# Patient Record
Sex: FEMALE | Race: WHITE | NOT HISPANIC OR LATINO | Employment: FULL TIME | URBAN - METROPOLITAN AREA
[De-identification: names, ages, dates, MRNs, and addresses within clinical notes are randomized per-mention and may not be internally consistent; named-entity substitution may affect disease eponyms.]

---

## 2016-08-04 LAB
EXTERNAL ABO GROUPING: NORMAL
EXTERNAL ANTIBODY SCREEN: NEGATIVE
EXTERNAL HEPATITIS B SURFACE ANTIGEN: NEGATIVE
EXTERNAL HEPATITIS C AB: NORMAL
EXTERNAL RH FACTOR: NEGATIVE
EXTERNAL RUBELLA QUALITATIVE: NORMAL
EXTERNAL SYPHILIS RPR SCREEN: NORMAL
HIV1 AB SPEC QL IA.RAPID: NEGATIVE

## 2017-02-17 LAB — EXTERNAL GROUP B STREP ANTIGEN: NEGATIVE

## 2017-02-18 ENCOUNTER — HOSPITAL ENCOUNTER (OUTPATIENT)
Facility: HOSPITAL | Age: 27
Setting detail: OBSERVATION
Discharge: HOME | End: 2017-02-18
Attending: OBSTETRICS & GYNECOLOGY | Admitting: OBSTETRICS & GYNECOLOGY

## 2017-02-18 VITALS
DIASTOLIC BLOOD PRESSURE: 60 MMHG | SYSTOLIC BLOOD PRESSURE: 108 MMHG | HEIGHT: 65 IN | OXYGEN SATURATION: 98 % | BODY MASS INDEX: 45.32 KG/M2 | TEMPERATURE: 98.1 F | WEIGHT: 272 LBS | HEART RATE: 73 BPM | RESPIRATION RATE: 18 BRPM

## 2017-02-18 PROBLEM — Z34.90 PREGNANCY: Status: ACTIVE | Noted: 2017-02-18

## 2017-02-18 LAB
ALBUMIN SERPL-MCNC: 2.9 G/DL (ref 3.5–5.2)
ALBUMIN/GLOB SERPL: 0.9 G/DL
ALP SERPL-CCNC: 92 U/L (ref 39–117)
ALT SERPL W P-5'-P-CCNC: 13 U/L (ref 1–33)
ANION GAP SERPL CALCULATED.3IONS-SCNC: 14.6 MMOL/L
AST SERPL-CCNC: 10 U/L (ref 1–32)
BASOPHILS # BLD AUTO: 0.03 10*3/MM3 (ref 0–0.2)
BASOPHILS NFR BLD AUTO: 0.2 % (ref 0–1.5)
BILIRUB SERPL-MCNC: <0.2 MG/DL (ref 0.1–1.2)
BUN BLD-MCNC: 10 MG/DL (ref 6–20)
BUN/CREAT SERPL: 17.9 (ref 7–25)
CALCIUM SPEC-SCNC: 9 MG/DL (ref 8.6–10.5)
CHLORIDE SERPL-SCNC: 103 MMOL/L (ref 98–107)
CO2 SERPL-SCNC: 20.4 MMOL/L (ref 22–29)
CREAT BLD-MCNC: 0.56 MG/DL (ref 0.57–1)
DEPRECATED RDW RBC AUTO: 42.3 FL (ref 37–54)
EOSINOPHIL # BLD AUTO: 0.09 10*3/MM3 (ref 0–0.7)
EOSINOPHIL NFR BLD AUTO: 0.7 % (ref 0.3–6.2)
ERYTHROCYTE [DISTWIDTH] IN BLOOD BY AUTOMATED COUNT: 14 % (ref 11.7–13)
EXPIRATION DATE: NORMAL
GFR SERPL CREATININE-BSD FRML MDRD: 132 ML/MIN/1.73
GLOBULIN UR ELPH-MCNC: 3.2 GM/DL
GLUCOSE BLD-MCNC: 84 MG/DL (ref 65–99)
HCT VFR BLD AUTO: 36.1 % (ref 35.6–45.5)
HGB BLD-MCNC: 12 G/DL (ref 11.9–15.5)
IMM GRANULOCYTES # BLD: 0.08 10*3/MM3 (ref 0–0.03)
IMM GRANULOCYTES NFR BLD: 0.6 % (ref 0–0.5)
LYMPHOCYTES # BLD AUTO: 2.67 10*3/MM3 (ref 0.9–4.8)
LYMPHOCYTES NFR BLD AUTO: 20.4 % (ref 19.6–45.3)
Lab: NORMAL
MCH RBC QN AUTO: 27.6 PG (ref 26.9–32)
MCHC RBC AUTO-ENTMCNC: 33.2 G/DL (ref 32.4–36.3)
MCV RBC AUTO: 83 FL (ref 80.5–98.2)
MONOCYTES # BLD AUTO: 1.26 10*3/MM3 (ref 0.2–1.2)
MONOCYTES NFR BLD AUTO: 9.6 % (ref 5–12)
NEUTROPHILS # BLD AUTO: 8.99 10*3/MM3 (ref 1.9–8.1)
NEUTROPHILS NFR BLD AUTO: 68.5 % (ref 42.7–76)
PLATELET # BLD AUTO: 228 10*3/MM3 (ref 140–500)
PMV BLD AUTO: 10.8 FL (ref 6–12)
POTASSIUM BLD-SCNC: 3.8 MMOL/L (ref 3.5–5.2)
PROT SERPL-MCNC: 6.1 G/DL (ref 6–8.5)
PROT UR STRIP-MCNC: NEGATIVE MG/DL
RBC # BLD AUTO: 4.35 10*6/MM3 (ref 3.9–5.2)
SODIUM BLD-SCNC: 138 MMOL/L (ref 136–145)
WBC NRBC COR # BLD: 13.12 10*3/MM3 (ref 4.5–10.7)

## 2017-02-18 PROCEDURE — G0378 HOSPITAL OBSERVATION PER HR: HCPCS

## 2017-02-18 PROCEDURE — 80053 COMPREHEN METABOLIC PANEL: CPT | Performed by: OBSTETRICS & GYNECOLOGY

## 2017-02-18 PROCEDURE — 85025 COMPLETE CBC W/AUTO DIFF WBC: CPT | Performed by: OBSTETRICS & GYNECOLOGY

## 2017-02-18 RX ORDER — ACETAMINOPHEN, ASPIRIN AND CAFFEINE 250; 250; 65 MG/1; MG/1; MG/1
1 TABLET, FILM COATED ORAL EVERY 6 HOURS PRN
COMMUNITY
End: 2017-03-09 | Stop reason: HOSPADM

## 2017-02-18 RX ORDER — LEVOTHYROXINE SODIUM 0.07 MG/1
75 TABLET ORAL DAILY
COMMUNITY

## 2017-02-18 RX ORDER — ASPIRIN 81 MG/1
81 TABLET ORAL DAILY
COMMUNITY

## 2017-02-18 RX ORDER — CALCIUM CARBONATE 200(500)MG
2 TABLET,CHEWABLE ORAL DAILY
COMMUNITY
End: 2017-03-09 | Stop reason: HOSPADM

## 2017-02-18 RX ORDER — PRENATAL VIT/IRON FUM/FOLIC AC 27MG-0.8MG
1 TABLET ORAL DAILY
COMMUNITY

## 2017-02-19 NOTE — NURSING NOTE
MD at [facility]. BPs and labs reviewed. Orders received to send patient home with a 24 hour urine, bring to office on Monday and bedrest until seen again. Eri Bullard RN

## 2017-02-19 NOTE — NURSING NOTE
Phone report called to Dr. Rodriguez. Informed of 25yo  here at 35.2 weeks with c/o increased BP at home. States that it has been increasing since 33 weeks. Was seen in the office yesterday and a CBC and CMP were drawn. Last night around MN she began experiencing dizziness and a HA and has taken her BP a handful of times since MN, all of which were elevated at home. Negative protein on admission here, first 2 BPs given to MD. Patient denies epigastric pain or blurred vision. FHR is reactive and category 1, no contractions. Orders received to observe patient, draw CBC and CMP. MD will be coming for delivery of another patient and will assess at that time. Eri Bullard RN

## 2017-02-19 NOTE — NURSING NOTE
Discharge instructions reviewed with patient. Explained 24 hour urine collection and to bring collection to the MD office on Monday morning. Patient to be on bedrest until seen in the office again, work note given to patient. Patient to return to L&D or call MD with persistent HA, blurred vision, epigastric pain, and/or elevated BP at home. Patient verbalizes understanding of these instructions and denies any further questions or concerns. Patient d/c home. Eri Bullard RN

## 2017-02-19 NOTE — NON STRESS TEST
Jyoti Ghotra, a  at 35w2d with an MARIA GUADALUPE of 3/23/2017, by Patient Reported, was seen at [facility] LABOR DELIVERY for a nonstress test.    Chief Complaint   Patient presents with   • Hypertension     elevated BP in office since 33 weeks. has taken her BP at home several times since MN and all elevated. patient also reports dizziness and HA since MN. denies epigastric pain or blurred vision.       Interpretation A  Nonstress Test Interpretation A: Reactive (17 2140 : Eri Bullard, GUY)        Agree/

## 2017-02-20 ENCOUNTER — LAB REQUISITION (OUTPATIENT)
Dept: LAB | Facility: HOSPITAL | Age: 27
End: 2017-02-20

## 2017-02-20 DIAGNOSIS — Z00.00 ENCOUNTER FOR GENERAL ADULT MEDICAL EXAMINATION WITHOUT ABNORMAL FINDINGS: ICD-10-CM

## 2017-02-20 LAB
ALBUMIN SERPL-MCNC: 3.3 G/DL (ref 3.5–5.2)
ALBUMIN/GLOB SERPL: 1 G/DL
ALP SERPL-CCNC: 99 U/L (ref 39–117)
ALT SERPL W P-5'-P-CCNC: 15 U/L (ref 1–33)
ANION GAP SERPL CALCULATED.3IONS-SCNC: 14.6 MMOL/L
AST SERPL-CCNC: 10 U/L (ref 1–32)
BILIRUB SERPL-MCNC: <0.2 MG/DL (ref 0.1–1.2)
BUN BLD-MCNC: 6 MG/DL (ref 6–20)
BUN/CREAT SERPL: 9 (ref 7–25)
CALCIUM SPEC-SCNC: 9.1 MG/DL (ref 8.6–10.5)
CHLORIDE SERPL-SCNC: 102 MMOL/L (ref 98–107)
CO2 SERPL-SCNC: 22.4 MMOL/L (ref 22–29)
COLLECT DURATION TIME UR: 24 HRS
COLLECT DURATION TIME UR: 24 HRS
CREAT BLD-MCNC: 0.67 MG/DL (ref 0.57–1)
CREAT CL 24H UR+SERPL-VRATE: 105.1 ML/MIN (ref 88–128)
CREAT CL 24H UR+SERPL-VRATE: 151.3 L/24 HR (ref 126.7–184.3)
CREAT UR-MCNC: 67.9 MG/DL
CREATINE 24H UR-MRATE: 1.32 G/24 HR (ref 0.7–1.6)
GFR SERPL CREATININE-BSD FRML MDRD: 107 ML/MIN/1.73
GLOBULIN UR ELPH-MCNC: 3.3 GM/DL
GLUCOSE BLD-MCNC: 160 MG/DL (ref 65–99)
POTASSIUM BLD-SCNC: 4 MMOL/L (ref 3.5–5.2)
PROT 24H UR-MRATE: 156 MG/24HOURS (ref 0–150)
PROT SERPL-MCNC: 6.6 G/DL (ref 6–8.5)
PROT UR-MCNC: 8 MG/DL
SODIUM BLD-SCNC: 139 MMOL/L (ref 136–145)
SPECIMEN VOL 24H UR: 1950 ML

## 2017-02-20 PROCEDURE — 82575 CREATININE CLEARANCE TEST: CPT | Performed by: OBSTETRICS & GYNECOLOGY

## 2017-02-20 PROCEDURE — 84156 ASSAY OF PROTEIN URINE: CPT | Performed by: OBSTETRICS & GYNECOLOGY

## 2017-02-20 PROCEDURE — 81050 URINALYSIS VOLUME MEASURE: CPT | Performed by: OBSTETRICS & GYNECOLOGY

## 2017-02-20 PROCEDURE — 80053 COMPREHEN METABOLIC PANEL: CPT | Performed by: OBSTETRICS & GYNECOLOGY

## 2017-03-03 ENCOUNTER — HOSPITAL ENCOUNTER (INPATIENT)
Facility: HOSPITAL | Age: 27
LOS: 6 days | Discharge: HOME | End: 2017-03-09
Attending: OBSTETRICS & GYNECOLOGY | Admitting: OBSTETRICS & GYNECOLOGY

## 2017-03-03 LAB
ABO GROUP BLD: NORMAL
ALBUMIN SERPL-MCNC: 3.3 G/DL (ref 3.5–5.2)
ALBUMIN/GLOB SERPL: 0.9 G/DL
ALP SERPL-CCNC: 115 U/L (ref 39–117)
ALT SERPL W P-5'-P-CCNC: 12 U/L (ref 1–33)
ANION GAP SERPL CALCULATED.3IONS-SCNC: 11.9 MMOL/L
AST SERPL-CCNC: 23 U/L (ref 1–32)
BASOPHILS # BLD AUTO: 0.03 10*3/MM3 (ref 0–0.2)
BASOPHILS NFR BLD AUTO: 0.2 % (ref 0–1.5)
BILIRUB SERPL-MCNC: 0.2 MG/DL (ref 0.1–1.2)
BLD GP AB SCN SERPL QL: POSITIVE
BUN BLD-MCNC: 11 MG/DL (ref 6–20)
BUN/CREAT SERPL: 17.2 (ref 7–25)
CALCIUM SPEC-SCNC: 9.7 MG/DL (ref 8.6–10.5)
CHLORIDE SERPL-SCNC: 104 MMOL/L (ref 98–107)
CO2 SERPL-SCNC: 24.1 MMOL/L (ref 22–29)
CREAT BLD-MCNC: 0.64 MG/DL (ref 0.57–1)
DEPRECATED RDW RBC AUTO: 43.5 FL (ref 37–54)
EOSINOPHIL # BLD AUTO: 0.14 10*3/MM3 (ref 0–0.7)
EOSINOPHIL NFR BLD AUTO: 1 % (ref 0.3–6.2)
ERYTHROCYTE [DISTWIDTH] IN BLOOD BY AUTOMATED COUNT: 14.7 % (ref 11.7–13)
GFR SERPL CREATININE-BSD FRML MDRD: 113 ML/MIN/1.73
GLOBULIN UR ELPH-MCNC: 3.6 GM/DL
GLUCOSE BLD-MCNC: 63 MG/DL (ref 65–99)
HCT VFR BLD AUTO: 39.8 % (ref 35.6–45.5)
HGB BLD-MCNC: 13.5 G/DL (ref 11.9–15.5)
IMM GRANULOCYTES # BLD: 0.09 10*3/MM3 (ref 0–0.03)
IMM GRANULOCYTES NFR BLD: 0.6 % (ref 0–0.5)
LYMPHOCYTES # BLD AUTO: 3.98 10*3/MM3 (ref 0.9–4.8)
LYMPHOCYTES NFR BLD AUTO: 27.9 % (ref 19.6–45.3)
MCH RBC QN AUTO: 27.7 PG (ref 26.9–32)
MCHC RBC AUTO-ENTMCNC: 33.9 G/DL (ref 32.4–36.3)
MCV RBC AUTO: 81.7 FL (ref 80.5–98.2)
MONOCYTES # BLD AUTO: 1.76 10*3/MM3 (ref 0.2–1.2)
MONOCYTES NFR BLD AUTO: 12.3 % (ref 5–12)
NEUTROPHILS # BLD AUTO: 8.29 10*3/MM3 (ref 1.9–8.1)
NEUTROPHILS NFR BLD AUTO: 58 % (ref 42.7–76)
PLATELET # BLD AUTO: 284 10*3/MM3 (ref 140–500)
PMV BLD AUTO: 11.7 FL (ref 6–12)
POTASSIUM BLD-SCNC: 5 MMOL/L (ref 3.5–5.2)
PROT SERPL-MCNC: 6.9 G/DL (ref 6–8.5)
RBC # BLD AUTO: 4.87 10*6/MM3 (ref 3.9–5.2)
RESIDUAL RHIG DETECTED: NORMAL
RH BLD: NEGATIVE
SODIUM BLD-SCNC: 140 MMOL/L (ref 136–145)
WBC NRBC COR # BLD: 14.29 10*3/MM3 (ref 4.5–10.7)

## 2017-03-03 PROCEDURE — 80053 COMPREHEN METABOLIC PANEL: CPT | Performed by: OBSTETRICS & GYNECOLOGY

## 2017-03-03 PROCEDURE — 86870 RBC ANTIBODY IDENTIFICATION: CPT

## 2017-03-03 PROCEDURE — 86850 RBC ANTIBODY SCREEN: CPT | Performed by: OBSTETRICS & GYNECOLOGY

## 2017-03-03 PROCEDURE — 86900 BLOOD TYPING SEROLOGIC ABO: CPT | Performed by: OBSTETRICS & GYNECOLOGY

## 2017-03-03 PROCEDURE — 86901 BLOOD TYPING SEROLOGIC RH(D): CPT | Performed by: OBSTETRICS & GYNECOLOGY

## 2017-03-03 PROCEDURE — 3E0P7GC INTRODUCTION OF OTHER THERAPEUTIC SUBSTANCE INTO FEMALE REPRODUCTIVE, VIA NATURAL OR ARTIFICIAL OPENING: ICD-10-PCS | Performed by: OBSTETRICS & GYNECOLOGY

## 2017-03-03 PROCEDURE — 85025 COMPLETE CBC W/AUTO DIFF WBC: CPT | Performed by: OBSTETRICS & GYNECOLOGY

## 2017-03-03 RX ORDER — BUTORPHANOL TARTRATE 1 MG/ML
1 INJECTION, SOLUTION INTRAMUSCULAR; INTRAVENOUS
Status: DISCONTINUED | OUTPATIENT
Start: 2017-03-03 | End: 2017-03-05 | Stop reason: HOSPADM

## 2017-03-03 RX ORDER — TERBUTALINE SULFATE 1 MG/ML
0.25 INJECTION, SOLUTION SUBCUTANEOUS AS NEEDED
Status: DISCONTINUED | OUTPATIENT
Start: 2017-03-03 | End: 2017-03-05 | Stop reason: HOSPADM

## 2017-03-03 RX ORDER — OXYTOCIN/RINGER'S LACTATE 10/500ML
2 PLASTIC BAG, INJECTION (ML) INTRAVENOUS
Status: DISCONTINUED | OUTPATIENT
Start: 2017-03-04 | End: 2017-03-05 | Stop reason: HOSPADM

## 2017-03-03 RX ORDER — SODIUM CHLORIDE 0.9 % (FLUSH) 0.9 %
1-10 SYRINGE (ML) INJECTION AS NEEDED
Status: DISCONTINUED | OUTPATIENT
Start: 2017-03-03 | End: 2017-03-05 | Stop reason: HOSPADM

## 2017-03-03 RX ORDER — ACETAMINOPHEN 325 MG/1
650 TABLET ORAL EVERY 4 HOURS PRN
Status: DISCONTINUED | OUTPATIENT
Start: 2017-03-03 | End: 2017-03-05 | Stop reason: HOSPADM

## 2017-03-03 RX ORDER — SODIUM CHLORIDE, SODIUM LACTATE, POTASSIUM CHLORIDE, CALCIUM CHLORIDE 600; 310; 30; 20 MG/100ML; MG/100ML; MG/100ML; MG/100ML
125 INJECTION, SOLUTION INTRAVENOUS CONTINUOUS
Status: DISCONTINUED | OUTPATIENT
Start: 2017-03-03 | End: 2017-03-05 | Stop reason: HOSPADM

## 2017-03-03 RX ADMIN — SODIUM CHLORIDE, POTASSIUM CHLORIDE, SODIUM LACTATE AND CALCIUM CHLORIDE 125 ML/HR: 600; 310; 30; 20 INJECTION, SOLUTION INTRAVENOUS at 19:29

## 2017-03-03 RX ADMIN — DINOPROSTONE 10 MG: 10 INSERT VAGINAL at 19:38

## 2017-03-03 NOTE — PLAN OF CARE
Problem: Patient Care Overview (Adult)  Goal: Plan of Care Review  Outcome: Ongoing (interventions implemented as appropriate)    03/03/17 1845   Coping/Psychosocial Response Interventions   Plan Of Care Reviewed With patient   Patient Care Overview   Progress no change       Goal: Adult Individualization and Mutuality  Outcome: Ongoing (interventions implemented as appropriate)    03/03/17 1845   Individualization   Patient Specific Preferences Vaginal delivery; NCB; VERENICE; breastfeeding within hour of delivery   Patient Specific Goals Ambulating as much as possible; successful hypnobirthing   Patient Specific Interventions Cervidil IOL; frequent position changes; breathing techniques         Problem: Labor (Cervical Ripen, Induct, Augment) (Adult,Obstetrics,Pediatric)  Goal: Signs and Symptoms of Listed Potential Problems Will be Absent or Manageable (Labor)  Outcome: Ongoing (interventions implemented as appropriate)    03/03/17 1845   Labor (Cervical Ripen, Induct, Augment)   Problems Assessed (Labor) all   Problems Present (Labor) none

## 2017-03-03 NOTE — H&P
[facility]  Obstetric History and Physical    Chief Complaint   Patient presents with   • Scheduled Induction     Gestational HTN       Subjective     Patient is a 26 y.o. female  currently at 37w1d, who presents for cervical ripening and induction due to gestational HTN.  Plan had been for IOL 3/5, but when seen in office today had 3+ protein and increasing BPs.  When BP is elevated she experiences light-headedness and visual changes..    Her prenatal care is benign.  Her previous obstetric/gynecological history is noted for the gestational HTN increasing over the last few weeks.    The following portions of the patients history were reviewed and updated as appropriate: current medications, allergies, past medical history, past surgical history, past family history, past social history and problem list .       Prenatal Information:  Prenatal Results         1st Trimester Ref. Range Date Time   CBC with auto diff       Rubella IgG ^ Immune   16    Hepatitis B SAg ^ Negative   16    RPR ^ Non-Reactive   16    ABO ^ O   16    Rh ^ Negative   16    Anibody Screen ^ Negative   16    HIV ^ Negative   16    Varicella IgG       Urinalysis with microscopy       Urine Culture       GC/Chlamydia/TV       ThinPrep/Pap       2nd and 3rd Trimester Ref. Range Date Time   Hemoglobin / Hematocrit  36.1 % 35.6 - 45.5 % 17   Hemoglobin  12.0 g/dL 11.9 - 15.5 g/dL 17   Group B Strep Culture ^ Negative   17    Glucose Challege Test 1 hour       Glucose Tolerance Test 3 hours       Pre-eclampsia Panel       Risk Screening Ref. Range Date Time   Fetal Fibronectin       Amnisure       Hepatitis C Antibody ^ Non-Reactive   16    Hemoglobin electrophoresis       Cystic Fibrosis       Hemoglobin A1C       MSAFP - 4       NIPT       AFP       Parvovirus IgG       Parvovirus IgM       POCT - glucose       [facility]       24 Hour urine - Total protein  156.0  mg/24hours 0.0 - 150.0 mg/24hours 17 1233   24 Hour urine - Creatinine clearance (See Report)  17 1233   Urinalysis with microscopy       Urine Culture       Drug Screening Ref. Range Date Time   Amphetamine Screen       Barbiturate Screen       Benzodiazepine Screen       Methadone Screen       Phencyclidine Screen       Opiates Screen       THC Screen       Cocaine Screen       Amphetamine Screen       Propoxyphene Screen       Buprenorphine Screen       Methamphetamine Screen       Oxycodone Screen       Tryicyclic Antidepressants Screen              Legend: ^: Historical            View all results for this pregnancy        External Prenatal Results         Pregnancy Outside Results - these were transcribed from office records.  See scanned records for details. Date Time   Hgb      Hct      ABO ^ O  16    Rh ^ Negative  16    Antibody Screen ^ Negative  16    Glucose Fasting GTT      Glucose Tolerance Test 1 hour      Glucose Tolerance Test 3 hour      Gonorrhea (discrete)      Chlamydia (discrete)      RPR ^ Non-Reactive  16    VDRL      Syphillis Antibody      Rubella ^ Immune  16    HBsAg ^ Negative  16    Herpes Simplex Virus PCR      Herpes Simplex VIrus Culture      HIV ^ Negative  16    Hep C RNA Quant PCR      Hep C Antibody ^ Non-Reactive  16    Urine Drug Screen      AFP      Group B Strep ^ Negative  17    GBS Susceptibility to Clindamycin      GBS Susceptibility to Eythromycin      Fetal Fibronectin      Genetic Testing, Maternal Blood             Legend: ^: Historical           Past OB History:     Obstetric History       T1      TAB0   SAB1   E0   M0   L1       # Outcome Date GA Lbr Juan M/2nd Weight Sex Delivery Anes PTL Lv   2 Term 17 37w3d 68:51 6 lb 10 oz (3.005 kg) F CS-LTranv EPI N Y      Name: DERRICK MENDOZA      Complications: Failure to Progress in Second Stage      Apgar1:  9                Apgar5: 9    1 SAB 09/01/15                  Past Medical History: Past Medical History   Diagnosis Date   • Abnormal Pap smear of cervix    • Disease of thyroid gland      hypothyroidism   • Gestational hypertension    • Migraine    • Polycystic ovary syndrome       Past Surgical History Past Surgical History   Procedure Laterality Date   • Endoscopy        Family History: History reviewed. No pertinent family history.   Social History:  reports that she has never smoked. She has never used smokeless tobacco.   reports that she does not drink alcohol.   reports that she does not use illicit drugs.        General ROS: Pertinent items are noted in HPI    Objective       Vital Signs Range for the last 24 hours  Temperature: Temp:  [98 °F (36.7 °C)-100.4 °F (38 °C)] 98.3 °F (36.8 °C)   Temp Source: Temp src: Oral   BP: BP: ()/() 132/82   Pulse: Heart Rate:  [] 94   Respirations: Resp:  [16-20] 16   SPO2: SpO2:  [94 %-99 %] 98 %   O2 Amount (l/min): Flow (L/min):  [10] 10   O2 Devices O2 Device: room air   Weight:       Physical Examination: General appearance - alert, well appearing, and in no distress  Chest - clear to auscultation, no wheezes, rales or rhonchi, symmetric air entry  Heart - normal rate, regular rhythm, normal S1, S2, no murmurs, rubs, clicks or gallops  Abdomen - soft, nontender, gravid, no masses or organomegaly  Pelvic - normal external genitalia, vulva, vagina, cervix, uterus and adnexa    Presentation: cephalic   Cervix: Exam by: Method: sterile exam per RN   Dilation: Dilation: 10   Effacement: Cervical Effacement: 90%   Station: Station: 0       Fetal Heart Rate Assessment   Method: Fetal HR Assessment Method: external   Beats/min: Fetal HR (Beats/Min): 137   Baseline: Fetal HR Baseline: normal range (110-160 bpm)   Varibility: Fetal HR Variability: moderate (amplitude range 6 to 25 bpm)   Accels: Fetal HR Accelerations: greater than/equal to 15 bpm, lasting at least 15 seconds   Decels:  Fetal HR Decelerations: late   Tracing Category: Fetal HR Tracing Category: Category II     Uterine Assessment   Method: Method: IUPC (intrauterine pressure catheter)   Frequency (min): Contraction Frequency (min): 2-3   Ctx Count in 10 min: Contraction Frequency in 10min: 4   Duration: Contraction Duration (sec): 60-90   Intensity: Contraction Intensity: moderate by palpation   Intensity by IUPC:     Resting Tone: Uterine Resting Tone: soft by palpation, relaxation >60 sec   Resting Tone by IUPC: Uterine Resting Tone (mmHg) By IUPC: 0   Lexington Units: Lexington Units: 50         Assessment/Plan     Active Problems:    Pregnancy        Assessment:  1.  Intrauterine pregnancy at 37w1d weeks gestation with reassuring fetal status.    2.  Gestational hypertension  3.  GBS negative, Rh negative    Plan:  1. Cervidil ripening/Pit IOL  2. Plan of care has been reviewed with patient and .  3.  Risks, benefits of treatment plan have been discussed.  4.  All questions have been answered.        Maida Doan MD  3/5/2017  9:00 PM

## 2017-03-04 ENCOUNTER — ANESTHESIA (OUTPATIENT)
Dept: LABOR AND DELIVERY | Facility: HOSPITAL | Age: 27
End: 2017-03-04

## 2017-03-04 ENCOUNTER — ANESTHESIA EVENT (OUTPATIENT)
Dept: LABOR AND DELIVERY | Facility: HOSPITAL | Age: 27
End: 2017-03-04

## 2017-03-04 ENCOUNTER — APPOINTMENT (OUTPATIENT)
Dept: LABOR AND DELIVERY | Facility: HOSPITAL | Age: 27
End: 2017-03-04

## 2017-03-04 PROCEDURE — C1755 CATHETER, INTRASPINAL: HCPCS | Performed by: ANESTHESIOLOGY

## 2017-03-04 PROCEDURE — 25010000002 BUTORPHANOL PER 1 MG: Performed by: OBSTETRICS & GYNECOLOGY

## 2017-03-04 PROCEDURE — 25010000002 ROPIVACAINE PER 1 MG: Performed by: ANESTHESIOLOGY

## 2017-03-04 PROCEDURE — C1755 CATHETER, INTRASPINAL: HCPCS

## 2017-03-04 RX ORDER — FAMOTIDINE 10 MG/ML
20 INJECTION, SOLUTION INTRAVENOUS ONCE AS NEEDED
Status: COMPLETED | OUTPATIENT
Start: 2017-03-04 | End: 2017-03-05

## 2017-03-04 RX ORDER — ONDANSETRON 2 MG/ML
4 INJECTION INTRAMUSCULAR; INTRAVENOUS ONCE AS NEEDED
Status: COMPLETED | OUTPATIENT
Start: 2017-03-04 | End: 2017-03-05

## 2017-03-04 RX ORDER — LIDOCAINE HYDROCHLORIDE AND EPINEPHRINE 15; 5 MG/ML; UG/ML
INJECTION, SOLUTION EPIDURAL AS NEEDED
Status: DISCONTINUED | OUTPATIENT
Start: 2017-03-04 | End: 2017-03-05 | Stop reason: SURG

## 2017-03-04 RX ORDER — ROPIVACAINE HYDROCHLORIDE 2 MG/ML
INJECTION, SOLUTION EPIDURAL; INFILTRATION; PERINEURAL AS NEEDED
Status: DISCONTINUED | OUTPATIENT
Start: 2017-03-04 | End: 2017-03-05 | Stop reason: SURG

## 2017-03-04 RX ORDER — DIPHENHYDRAMINE HYDROCHLORIDE 50 MG/ML
12.5 INJECTION INTRAMUSCULAR; INTRAVENOUS EVERY 8 HOURS PRN
Status: DISCONTINUED | OUTPATIENT
Start: 2017-03-04 | End: 2017-03-05 | Stop reason: HOSPADM

## 2017-03-04 RX ADMIN — SODIUM CHLORIDE, POTASSIUM CHLORIDE, SODIUM LACTATE AND CALCIUM CHLORIDE 125 ML/HR: 600; 310; 30; 20 INJECTION, SOLUTION INTRAVENOUS at 21:04

## 2017-03-04 RX ADMIN — ACETAMINOPHEN 650 MG: 325 TABLET, FILM COATED ORAL at 06:21

## 2017-03-04 RX ADMIN — ROPIVACAINE HYDROCHLORIDE 5 ML: 2 INJECTION, SOLUTION EPIDURAL; INFILTRATION at 17:22

## 2017-03-04 RX ADMIN — ROPIVACAINE HYDROCHLORIDE 5 ML: 2 INJECTION, SOLUTION EPIDURAL; INFILTRATION at 17:27

## 2017-03-04 RX ADMIN — OXYTOCIN 2 MILLI-UNITS/MIN: 10 INJECTION, SOLUTION INTRAMUSCULAR; INTRAVENOUS at 09:32

## 2017-03-04 RX ADMIN — LIDOCAINE HYDROCHLORIDE AND EPINEPHRINE 3 ML: 15; 5 INJECTION, SOLUTION EPIDURAL at 17:19

## 2017-03-04 RX ADMIN — BUTORPHANOL TARTRATE 1 MG: 1 INJECTION, SOLUTION INTRAMUSCULAR; INTRAVENOUS at 16:42

## 2017-03-04 RX ADMIN — SODIUM CHLORIDE, POTASSIUM CHLORIDE, SODIUM LACTATE AND CALCIUM CHLORIDE 125 ML/HR: 600; 310; 30; 20 INJECTION, SOLUTION INTRAVENOUS at 03:30

## 2017-03-04 RX ADMIN — Medication 10 ML/HR: at 17:27

## 2017-03-04 RX ADMIN — SODIUM CHLORIDE, POTASSIUM CHLORIDE, SODIUM LACTATE AND CALCIUM CHLORIDE 999 ML/HR: 600; 310; 30; 20 INJECTION, SOLUTION INTRAVENOUS at 17:06

## 2017-03-04 NOTE — ANESTHESIA PREPROCEDURE EVALUATION
Anesthesia Evaluation     Patient summary reviewed and Nursing notes reviewed   no history of anesthetic complications:  NPO Status: > 8 hours   Airway   Mallampati: II  TM distance: >3 FB  Neck ROM: full  no difficulty expected  Dental - normal exam     Pulmonary - negative pulmonary ROS and normal exam    breath sounds clear to auscultation  (-) rhonchi, decreased breath sounds, wheezes, rales, stridor  Cardiovascular - normal exam    Rhythm: regular  Rate: normal    (-) murmur, weak pulses, friction rub, systolic click, carotid bruits, JVD, peripheral edema      Neuro/Psych- negative ROS  GI/Hepatic/Renal/Endo    (+) obesity,  hypothyroidism,     Musculoskeletal (-) negative ROS    Abdominal  - normal exam    Abdomen: soft.   Substance History - negative use     OB/GYN    (+) Pregnant, pregnancy induced hypertension        Other - negative ROS                                   Anesthesia Plan    ASA 2     epidural     Anesthetic plan and risks discussed with patient and spouse/significant other.

## 2017-03-04 NOTE — PLAN OF CARE
Problem: Patient Care Overview (Adult)  Goal: Plan of Care Review  Outcome: Ongoing (interventions implemented as appropriate)    03/04/17 0803   Coping/Psychosocial Response Interventions   Plan Of Care Reviewed With patient;spouse   Patient Care Overview   Progress no change       Goal: Adult Individualization and Mutuality  Outcome: Ongoing (interventions implemented as appropriate)    03/03/17 1845   Individualization   Patient Specific Preferences Vaginal delivery; NCB; VERENICE; breastfeeding within hour of delivery   Patient Specific Goals Ambulating as much as possible; successful hypnobirthing   Patient Specific Interventions Cervidil IOL; frequent position changes; breathing techniques       Goal: Discharge Needs Assessment  Outcome: Ongoing (interventions implemented as appropriate)    03/04/17 0803   Discharge Needs Assessment   Concerns To Be Addressed no discharge needs identified         Problem: Labor (Cervical Ripen, Induct, Augment) (Adult,Obstetrics,Pediatric)  Goal: Signs and Symptoms of Listed Potential Problems Will be Absent or Manageable (Labor)  Outcome: Ongoing (interventions implemented as appropriate)    03/04/17 0803   Labor (Cervical Ripen, Induct, Augment)   Problems Assessed (Labor) all   Problems Present (Labor) none

## 2017-03-04 NOTE — H&P
[facility]  Obstetric History and Physical    Patient Name: Jyoti Ghotra  :  1990  MRN:  [id number]      No chief complaint on file.      Subjective     Patient is a 26 y.o. female  currently at 37w2d, who presented from office yesterday for IOL. Has been followed for gestational HTN since 34 wks. Had 24 urine total protein that was normal on 17. Apparently BP significantly more elevated in office so sent for induction. Received cervidil overnight.       Her prenatal care has been  complicated by  hypertension  gestational hypertension and thyroid disease  hypothryoid. Rh NEG.       Objective       Vital Signs Range for the last 24 hours  Temperature: Temp:  [97.9 °F (36.6 °C)-98.3 °F (36.8 °C)] 98 °F (36.7 °C)   Temp Source: Temp src: Oral   BP: BP: (101-150)/() 130/77   Pulse: Heart Rate:  [] 81   Respirations: Resp:  [16] 16                   Physical Examination:     General :  Alert in NAD  Abdomen: Gravid, EFW 7#  by leopolds    Presentation: vertex   Cervix: Exam by: Method: sterile exam per RN   Dilation: Dilation: 2   Effacement: Cervical Effacement: 70%   Station: Station: -2       Fetal Heart Rate Assessment   Method: Fetal HR Assessment Method: external   Beats/min: Fetal HR (Beats/Min): 150   Baseline: Fetal HR Baseline: normal range (110-160 bpm)   Varibility: Fetal HR Variability: moderate (amplitude range 6 to 25 bpm)   Accels: Fetal HR Accelerations: greater than/equal to 15 bpm, lasting at least 15 seconds   Decels: Fetal HR Decelerations: absent   Tracing Category: Fetal HR Tracing Category: Category II     Uterine Assessment   Method: Method: TOCO (external toco transducer)   Frequency (min): Contraction Frequency (min): 2-3   Ctx Count in 10 min: Contraction Frequency in 10min: 0   Duration: Contraction Duration (sec): 60-70   Intensity: Contraction Intensity: moderate by palpation   Intensity by IUPC:     Resting Tone: Uterine Resting Tone: soft by  palpation, relaxation >60 sec   Resting Tone by IUPC:     Dimitry Units:               Results from last 7 days  Lab Units 17  1741   WBC 10*3/mm3 14.29*   HEMOGLOBIN g/dL 13.5   HEMATOCRIT % 39.8   PLATELETS 10*3/mm3 284       Results from last 7 days  Lab Units 17  1741   SODIUM mmol/L 140   POTASSIUM mmol/L 5.0   CHLORIDE mmol/L 104   TOTAL CO2 mmol/L 24.1   BUN mg/dL 11   CREATININE mg/dL 0.64   CALCIUM mg/dL 9.7   BILIRUBIN mg/dL 0.2   ALK PHOS U/L 115   ALT (SGPT) U/L 12   AST (SGOT) U/L 23   GLUCOSE mg/dL 63*         Assessment/Plan   1.  Intrauterine pregnancy at 37w2d weeks gestation with gestational HTN. BP have been overall stable here. Labs normal.  Reassuring fetal status.     Continue pitocin. Anticipate . Plan of care has been reviewed with patient and family. All questions answered.    Active Problems:    Pregnancy        H&P updated. The patient was examined and the following changes are noted above.         Petar Contreras MD  3/4/2017  3:49 PM

## 2017-03-05 LAB
ABO GROUP BLD: NORMAL
ATMOSPHERIC PRESS: 761.6 MMHG
BASE EXCESS BLDCOA CALC-SCNC: -4.5 MMOL/L
BDY SITE: ABNORMAL
GAS FLOW AIRWAY: 2 LPM
HCO3 BLDCOA-SCNC: 22.6 MMOL/L (ref 22–28)
MODALITY: ABNORMAL
PCO2 BLDCOA: 47.2 MMHG
PH BLDCOA: 7.29 [PH] (ref 7.18–7.34)
PO2 BLDCOA: <22 MMHG (ref 12–26)
RH BLD: NEGATIVE
SAO2 % BLDCOA: 23.6 % (ref 92–99)

## 2017-03-05 PROCEDURE — 94799 UNLISTED PULMONARY SVC/PX: CPT

## 2017-03-05 PROCEDURE — 25010000002 ONDANSETRON PER 1 MG: Performed by: ANESTHESIOLOGY

## 2017-03-05 PROCEDURE — 82803 BLOOD GASES ANY COMBINATION: CPT

## 2017-03-05 PROCEDURE — 25010000002 MORPHINE PER 10 MG

## 2017-03-05 RX ORDER — ONDANSETRON 4 MG/1
4 TABLET, FILM COATED ORAL EVERY 6 HOURS PRN
Status: DISCONTINUED | OUTPATIENT
Start: 2017-03-05 | End: 2017-03-09 | Stop reason: HOSPADM

## 2017-03-05 RX ORDER — DIPHENHYDRAMINE HCL 25 MG
25 CAPSULE ORAL EVERY 4 HOURS PRN
Status: DISCONTINUED | OUTPATIENT
Start: 2017-03-05 | End: 2017-03-06 | Stop reason: SDUPTHER

## 2017-03-05 RX ORDER — SIMETHICONE 80 MG
80 TABLET,CHEWABLE ORAL 4 TIMES DAILY PRN
Status: DISCONTINUED | OUTPATIENT
Start: 2017-03-05 | End: 2017-03-09 | Stop reason: HOSPADM

## 2017-03-05 RX ORDER — OXYTOCIN/RINGER'S LACTATE 10/500ML
999 PLASTIC BAG, INJECTION (ML) INTRAVENOUS ONCE
Status: DISCONTINUED | OUTPATIENT
Start: 2017-03-05 | End: 2017-03-05 | Stop reason: HOSPADM

## 2017-03-05 RX ORDER — NALOXONE HCL 0.4 MG/ML
0.4 VIAL (ML) INJECTION
Status: DISCONTINUED | OUTPATIENT
Start: 2017-03-05 | End: 2017-03-09 | Stop reason: HOSPADM

## 2017-03-05 RX ORDER — OXYCODONE HYDROCHLORIDE AND ACETAMINOPHEN 5; 325 MG/1; MG/1
1 TABLET ORAL EVERY 4 HOURS PRN
Status: DISCONTINUED | OUTPATIENT
Start: 2017-03-05 | End: 2017-03-09 | Stop reason: HOSPADM

## 2017-03-05 RX ORDER — DIPHENHYDRAMINE HYDROCHLORIDE 50 MG/ML
25 INJECTION INTRAMUSCULAR; INTRAVENOUS EVERY 4 HOURS PRN
Status: DISCONTINUED | OUTPATIENT
Start: 2017-03-05 | End: 2017-03-09 | Stop reason: HOSPADM

## 2017-03-05 RX ORDER — MORPHINE SULFATE 2 MG/ML
2 INJECTION, SOLUTION INTRAMUSCULAR; INTRAVENOUS EVERY 4 HOURS PRN
Status: DISCONTINUED | OUTPATIENT
Start: 2017-03-05 | End: 2017-03-09 | Stop reason: HOSPADM

## 2017-03-05 RX ORDER — LIDOCAINE HYDROCHLORIDE AND EPINEPHRINE BITARTRATE 20; .01 MG/ML; MG/ML
INJECTION, SOLUTION SUBCUTANEOUS AS NEEDED
Status: DISCONTINUED | OUTPATIENT
Start: 2017-03-05 | End: 2017-03-05 | Stop reason: SURG

## 2017-03-05 RX ORDER — LANOLIN 100 %
OINTMENT (GRAM) TOPICAL
Status: DISCONTINUED | OUTPATIENT
Start: 2017-03-05 | End: 2017-03-09 | Stop reason: HOSPADM

## 2017-03-05 RX ORDER — HYDROMORPHONE HYDROCHLORIDE 1 MG/ML
0.25 INJECTION, SOLUTION INTRAMUSCULAR; INTRAVENOUS; SUBCUTANEOUS
Status: DISCONTINUED | OUTPATIENT
Start: 2017-03-05 | End: 2017-03-05 | Stop reason: HOSPADM

## 2017-03-05 RX ORDER — MISOPROSTOL 200 UG/1
600 TABLET ORAL ONCE AS NEEDED
Status: COMPLETED | OUTPATIENT
Start: 2017-03-05 | End: 2017-03-05

## 2017-03-05 RX ORDER — ONDANSETRON 2 MG/ML
4 INJECTION INTRAMUSCULAR; INTRAVENOUS EVERY 6 HOURS PRN
Status: DISCONTINUED | OUTPATIENT
Start: 2017-03-05 | End: 2017-03-09 | Stop reason: HOSPADM

## 2017-03-05 RX ORDER — ONDANSETRON 4 MG/1
4 TABLET, ORALLY DISINTEGRATING ORAL EVERY 6 HOURS PRN
Status: DISCONTINUED | OUTPATIENT
Start: 2017-03-05 | End: 2017-03-09 | Stop reason: HOSPADM

## 2017-03-05 RX ORDER — LEVOTHYROXINE SODIUM 0.07 MG/1
75 TABLET ORAL
Status: DISCONTINUED | OUTPATIENT
Start: 2017-03-05 | End: 2017-03-09 | Stop reason: HOSPADM

## 2017-03-05 RX ORDER — DOCUSATE SODIUM 100 MG/1
100 CAPSULE, LIQUID FILLED ORAL 2 TIMES DAILY PRN
Status: DISCONTINUED | OUTPATIENT
Start: 2017-03-05 | End: 2017-03-09 | Stop reason: HOSPADM

## 2017-03-05 RX ORDER — SODIUM CHLORIDE 0.9 % (FLUSH) 0.9 %
1-10 SYRINGE (ML) INJECTION AS NEEDED
Status: DISCONTINUED | OUTPATIENT
Start: 2017-03-05 | End: 2017-03-09 | Stop reason: HOSPADM

## 2017-03-05 RX ORDER — PROMETHAZINE HYDROCHLORIDE 25 MG/ML
12.5 INJECTION, SOLUTION INTRAMUSCULAR; INTRAVENOUS EVERY 6 HOURS PRN
Status: DISCONTINUED | OUTPATIENT
Start: 2017-03-05 | End: 2017-03-09 | Stop reason: HOSPADM

## 2017-03-05 RX ORDER — DIPHENHYDRAMINE HCL 25 MG
25 CAPSULE ORAL EVERY 4 HOURS PRN
Status: DISCONTINUED | OUTPATIENT
Start: 2017-03-05 | End: 2017-03-09 | Stop reason: HOSPADM

## 2017-03-05 RX ORDER — CEFAZOLIN SODIUM 2 G/100ML
INJECTION, SOLUTION INTRAVENOUS
Status: DISPENSED
Start: 2017-03-05 | End: 2017-03-05

## 2017-03-05 RX ORDER — OXYTOCIN/RINGER'S LACTATE 10/500ML
125 PLASTIC BAG, INJECTION (ML) INTRAVENOUS CONTINUOUS PRN
Status: DISCONTINUED | OUTPATIENT
Start: 2017-03-05 | End: 2017-03-05 | Stop reason: HOSPADM

## 2017-03-05 RX ORDER — MAGNESIUM HYDROXIDE/ALUMINUM HYDROXICE/SIMETHICONE 120; 1200; 1200 MG/30ML; MG/30ML; MG/30ML
30 SUSPENSION ORAL EVERY 4 HOURS PRN
Status: DISCONTINUED | OUTPATIENT
Start: 2017-03-05 | End: 2017-03-09 | Stop reason: HOSPADM

## 2017-03-05 RX ORDER — OXYTOCIN/RINGER'S LACTATE 10/500ML
999 PLASTIC BAG, INJECTION (ML) INTRAVENOUS ONCE
Status: DISCONTINUED | OUTPATIENT
Start: 2017-03-05 | End: 2017-03-09 | Stop reason: HOSPADM

## 2017-03-05 RX ORDER — PROMETHAZINE HYDROCHLORIDE 25 MG/1
25 TABLET ORAL EVERY 6 HOURS PRN
Status: DISCONTINUED | OUTPATIENT
Start: 2017-03-05 | End: 2017-03-09 | Stop reason: HOSPADM

## 2017-03-05 RX ORDER — OXYTOCIN/RINGER'S LACTATE 10/500ML
125 PLASTIC BAG, INJECTION (ML) INTRAVENOUS CONTINUOUS PRN
Status: ACTIVE | OUTPATIENT
Start: 2017-03-05 | End: 2017-03-06

## 2017-03-05 RX ORDER — MISOPROSTOL 200 UG/1
800 TABLET ORAL AS NEEDED
Status: DISCONTINUED | OUTPATIENT
Start: 2017-03-05 | End: 2017-03-05 | Stop reason: HOSPADM

## 2017-03-05 RX ORDER — FAMOTIDINE 10 MG/ML
20 INJECTION, SOLUTION INTRAVENOUS ONCE
Status: DISCONTINUED | OUTPATIENT
Start: 2017-03-05 | End: 2017-03-09 | Stop reason: HOSPADM

## 2017-03-05 RX ORDER — PROMETHAZINE HYDROCHLORIDE 25 MG/ML
6.25 INJECTION, SOLUTION INTRAMUSCULAR; INTRAVENOUS EVERY 6 HOURS PRN
Status: DISCONTINUED | OUTPATIENT
Start: 2017-03-05 | End: 2017-03-09 | Stop reason: HOSPADM

## 2017-03-05 RX ORDER — ACETAMINOPHEN 325 MG/1
650 TABLET ORAL EVERY 4 HOURS PRN
Status: DISCONTINUED | OUTPATIENT
Start: 2017-03-05 | End: 2017-03-09 | Stop reason: HOSPADM

## 2017-03-05 RX ORDER — CARBOPROST TROMETHAMINE 250 UG/ML
250 INJECTION, SOLUTION INTRAMUSCULAR AS NEEDED
Status: DISCONTINUED | OUTPATIENT
Start: 2017-03-05 | End: 2017-03-05 | Stop reason: HOSPADM

## 2017-03-05 RX ORDER — IBUPROFEN 800 MG/1
800 TABLET ORAL EVERY 8 HOURS PRN
Status: DISCONTINUED | OUTPATIENT
Start: 2017-03-05 | End: 2017-03-09 | Stop reason: HOSPADM

## 2017-03-05 RX ORDER — ONDANSETRON 2 MG/ML
4 INJECTION INTRAMUSCULAR; INTRAVENOUS ONCE AS NEEDED
Status: DISCONTINUED | OUTPATIENT
Start: 2017-03-05 | End: 2017-03-09 | Stop reason: HOSPADM

## 2017-03-05 RX ORDER — OXYCODONE AND ACETAMINOPHEN 7.5; 325 MG/1; MG/1
1 TABLET ORAL EVERY 4 HOURS PRN
Status: DISCONTINUED | OUTPATIENT
Start: 2017-03-05 | End: 2017-03-09 | Stop reason: HOSPADM

## 2017-03-05 RX ORDER — CEFAZOLIN SODIUM 2 G/100ML
2 INJECTION, SOLUTION INTRAVENOUS ONCE
Status: DISCONTINUED | OUTPATIENT
Start: 2017-03-05 | End: 2017-03-05 | Stop reason: HOSPADM

## 2017-03-05 RX ORDER — PRENATAL VIT NO.126/IRON/FOLIC 28MG-0.8MG
1 TABLET ORAL DAILY
Status: DISCONTINUED | OUTPATIENT
Start: 2017-03-05 | End: 2017-03-09 | Stop reason: HOSPADM

## 2017-03-05 RX ORDER — METHYLERGONOVINE MALEATE 0.2 MG/ML
200 INJECTION INTRAVENOUS ONCE AS NEEDED
Status: DISCONTINUED | OUTPATIENT
Start: 2017-03-05 | End: 2017-03-05 | Stop reason: HOSPADM

## 2017-03-05 RX ORDER — HYDROXYZINE 50 MG/1
50 TABLET, FILM COATED ORAL EVERY 6 HOURS PRN
Status: DISCONTINUED | OUTPATIENT
Start: 2017-03-05 | End: 2017-03-09 | Stop reason: HOSPADM

## 2017-03-05 RX ORDER — MORPHINE SULFATE 2 MG/ML
2 INJECTION, SOLUTION INTRAMUSCULAR; INTRAVENOUS
Status: ACTIVE | OUTPATIENT
Start: 2017-03-05 | End: 2017-03-06

## 2017-03-05 RX ORDER — LEVOTHYROXINE SODIUM 0.07 MG/1
75 TABLET ORAL DAILY
Status: DISCONTINUED | OUTPATIENT
Start: 2017-03-05 | End: 2017-03-06 | Stop reason: SDUPTHER

## 2017-03-05 RX ORDER — MORPHINE SULFATE 1 MG/ML
INJECTION, SOLUTION EPIDURAL; INTRATHECAL; INTRAVENOUS
Status: COMPLETED
Start: 2017-03-05 | End: 2017-03-05

## 2017-03-05 RX ORDER — NALOXONE HCL 0.4 MG/ML
0.2 VIAL (ML) INJECTION
Status: DISCONTINUED | OUTPATIENT
Start: 2017-03-05 | End: 2017-03-09 | Stop reason: HOSPADM

## 2017-03-05 RX ORDER — BISACODYL 10 MG
10 SUPPOSITORY, RECTAL RECTAL DAILY PRN
Status: DISCONTINUED | OUTPATIENT
Start: 2017-03-05 | End: 2017-03-09 | Stop reason: HOSPADM

## 2017-03-05 RX ORDER — SODIUM CHLORIDE, SODIUM LACTATE, POTASSIUM CHLORIDE, CALCIUM CHLORIDE 600; 310; 30; 20 MG/100ML; MG/100ML; MG/100ML; MG/100ML
9 INJECTION, SOLUTION INTRAVENOUS CONTINUOUS
Status: DISCONTINUED | OUTPATIENT
Start: 2017-03-05 | End: 2017-03-09 | Stop reason: HOSPADM

## 2017-03-05 RX ORDER — PROMETHAZINE HYDROCHLORIDE 12.5 MG/1
12.5 SUPPOSITORY RECTAL EVERY 6 HOURS PRN
Status: DISCONTINUED | OUTPATIENT
Start: 2017-03-05 | End: 2017-03-09 | Stop reason: HOSPADM

## 2017-03-05 RX ADMIN — SODIUM CHLORIDE, POTASSIUM CHLORIDE, SODIUM LACTATE AND CALCIUM CHLORIDE 1000 ML: 600; 310; 30; 20 INJECTION, SOLUTION INTRAVENOUS at 03:25

## 2017-03-05 RX ADMIN — OXYTOCIN 1200 ML/HR: 10 INJECTION, SOLUTION INTRAMUSCULAR; INTRAVENOUS at 04:43

## 2017-03-05 RX ADMIN — LIDOCAINE HYDROCHLORIDE AND EPINEPHRINE 5 ML: 20; 10 INJECTION, SOLUTION INFILTRATION; PERINEURAL at 03:57

## 2017-03-05 RX ADMIN — LIDOCAINE HYDROCHLORIDE 5 ML: 15 INJECTION, SOLUTION EPIDURAL; INFILTRATION; INTRACAUDAL; PERINEURAL at 03:15

## 2017-03-05 RX ADMIN — LIDOCAINE HYDROCHLORIDE 5 ML: 15 INJECTION, SOLUTION EPIDURAL; INFILTRATION; INTRACAUDAL; PERINEURAL at 03:19

## 2017-03-05 RX ADMIN — IBUPROFEN 800 MG: 800 TABLET, FILM COATED ORAL at 18:18

## 2017-03-05 RX ADMIN — ONDANSETRON 4 MG: 2 INJECTION INTRAMUSCULAR; INTRAVENOUS at 04:26

## 2017-03-05 RX ADMIN — LIDOCAINE HYDROCHLORIDE AND EPINEPHRINE 5 ML: 20; 10 INJECTION, SOLUTION INFILTRATION; PERINEURAL at 03:55

## 2017-03-05 RX ADMIN — LIDOCAINE HYDROCHLORIDE AND EPINEPHRINE 5 ML: 20; 10 INJECTION, SOLUTION INFILTRATION; PERINEURAL at 04:40

## 2017-03-05 RX ADMIN — OXYTOCIN 125 ML/HR: 10 INJECTION, SOLUTION INTRAMUSCULAR; INTRAVENOUS at 10:41

## 2017-03-05 RX ADMIN — IBUPROFEN 800 MG: 800 TABLET, FILM COATED ORAL at 10:25

## 2017-03-05 RX ADMIN — LEVOTHYROXINE SODIUM 75 MCG: 75 TABLET ORAL at 12:01

## 2017-03-05 RX ADMIN — LIDOCAINE HYDROCHLORIDE 5 ML: 15 INJECTION, SOLUTION EPIDURAL; INFILTRATION; INTRACAUDAL; PERINEURAL at 03:17

## 2017-03-05 RX ADMIN — SODIUM CHLORIDE, POTASSIUM CHLORIDE, SODIUM LACTATE AND CALCIUM CHLORIDE: 600; 310; 30; 20 INJECTION, SOLUTION INTRAVENOUS at 03:52

## 2017-03-05 RX ADMIN — Medication 125 ML/HR: at 05:43

## 2017-03-05 RX ADMIN — FAMOTIDINE 20 MG: 10 INJECTION, SOLUTION INTRAVENOUS at 03:54

## 2017-03-05 RX ADMIN — AZITHROMYCIN DIHYDRATE 500 MG: 500 INJECTION, POWDER, LYOPHILIZED, FOR SOLUTION INTRAVENOUS at 03:59

## 2017-03-05 RX ADMIN — MORPHINE SULFATE 4 MG: 1 INJECTION EPIDURAL; INTRATHECAL; INTRAVENOUS at 04:42

## 2017-03-05 RX ADMIN — MISOPROSTOL 600 MCG: 200 TABLET ORAL at 10:25

## 2017-03-05 RX ADMIN — OXYCODONE HYDROCHLORIDE AND ACETAMINOPHEN 1 TABLET: 5; 325 TABLET ORAL at 15:27

## 2017-03-05 RX ADMIN — LIDOCAINE HYDROCHLORIDE AND EPINEPHRINE 5 ML: 20; 10 INJECTION, SOLUTION INFILTRATION; PERINEURAL at 03:59

## 2017-03-05 NOTE — PLAN OF CARE
Problem: Patient Care Overview (Adult)  Goal: Plan of Care Review  Outcome: Ongoing (interventions implemented as appropriate)  Goal: Adult Individualization and Mutuality  Outcome: Ongoing (interventions implemented as appropriate)  Goal: Discharge Needs Assessment  Outcome: Ongoing (interventions implemented as appropriate)    Problem: Labor (Cervical Ripen, Induct, Augment) (Adult,Obstetrics,Pediatric)  Goal: Signs and Symptoms of Listed Potential Problems Will be Absent or Manageable (Labor)  Outcome: Ongoing (interventions implemented as appropriate)

## 2017-03-05 NOTE — LACTATION NOTE
This note was copied from a baby's chart.  Mom has had some heavy PP bleeding, but otherwise doing well. Baby has been at the breast but no real nursing has occurred . Baby has a chompy , uncoordinated suckle and bites over and over. Tried to reassure mom that this can be very normal for C/S babies and Mom's with long labors. Mom using a manual pump at this time.

## 2017-03-05 NOTE — H&P
[facility]  Obstetric Preop History and Physical:    Patient Name: Jyoti Ghotra  :  1990  MRN:  [id number]      Chief Complaint   Patient presents with   • Scheduled Induction     Gestational HTN       Subjective                26 y.o. female  currently at 37w3d, who presented for induction of labor due to hypertension. Now pushed x 2 hour with no descent beyond 1+ station. Exhausted and d/w options - prefers to proceed with C/S.               Past OB History:       Obstetric History       T0      TAB0   SAB1   E0   M0   L0       # Outcome Date GA Lbr Juan M/2nd Weight Sex Delivery Anes PTL Lv   2 Current            1 SAB 09/01/15                  Past Medical History: Past Medical History   Diagnosis Date   • Abnormal Pap smear of cervix    • Disease of thyroid gland      hypothyroidism   • Gestational hypertension    • Migraine    • Polycystic ovary syndrome       Past Surgical History Past Surgical History   Procedure Laterality Date   • Endoscopy        Family History: History reviewed. No pertinent family history.   Social History:  reports that she has never smoked. She has never used smokeless tobacco.   reports that she does not drink alcohol.   reports that she does not use illicit drugs.    Allergies:     Review of patient's allergies indicates no known allergies.     Objective       Vital Signs Range for the last 24 hours  Temperature: Temp:  [98 °F (36.7 °C)-98.6 °F (37 °C)] 98.6 °F (37 °C)   Temp Source: Temp src: Oral   BP: BP: ()/() 138/71   Pulse: Heart Rate:  [62-95] 75   Respirations: Resp:  [16-20] 20   SPO2:          fentaNYL (2 mcg/ml) and ropivacaine (0.2%) in 250 ml (PREMIX)  Last Rate: Stopped (17)   lactated ringers 125 mL/hr Last Rate: 999 mL/hr (17 0051)   oxytocin 125 mL/hr    oxytocin 2 cindy-units/min Last Rate: Stopped (17 032)                      Physical Exam:  General: Alert in NAD   Heart Normal rate and regular  rhythm   Lungs Clear to auscultation bilaterally     Abdomen Gravid, soft, no masses   Extremities trace edema         Cervix: Exam by: Method: sterile exam per RN   Dilation: Dilation: 10   Effacement: Cervical Effacement: 90%   Station: Station: 0             FHR:   TOCO: 120's moderate variability  Q 5 mins.          Assessment/Plan : Arrest of Descent:  Recommend proceeding with  section for delivery. Indications for proceeding, risks and benefits have been discussed with patient and family. . All questions answered.         Petar Contreras MD  3/5/2017  3:44 AM

## 2017-03-05 NOTE — OP NOTE
[facility]   Section Operative Note    Patient Name: Jyoti Ghotra  :  1990  MRN:  [id number]      Date of procedure:  3/5/2017        Pre-Operative Dx:   1.  IUP at 37w3d weeks   2. Arrest of Descent         Postoperative Dx:  Same        Procedure: , Low Transverse      Surgeon: Petar Contreras MD      Assistant: Anthony Esteban MD     Anesthesia: Epidural    EBL: 800 ml               Findings:                           Amniotic Fluid  - clear  Uterus normal  Tubes and ovaries normal bilaterally              Infant:      Infant's Name: Hooper     Gender: Viable female  infant     Weight: 6 lb 10 oz (3.005 kg)     Apgars: 9   @ 1 minute /     9   @ 5 minutes                 Indication for C/Section:     Arrest of Descent    Pushed for 2 hours with no descent of vertex beyond +1 station. Pt exhausted and declined additional time for pushing.             Procedure Details:  The patient was taken to the operating room where epidural anesthesia was found to be adequate. She was prepped and draped in the usual sterile manner in the dorsal supine position with leftward tilt. A Pfannenstiel incision was made and carried down to the fascia. The fascia was incised in the mid-line and extended transversely with Aragon scissors. The fascia was grasped and elevated and  from the underlying rectus muscles superiorly and inferiorly. The peritoneum was identified and entered. Peritoneal incision was extended superiorly and inferiorly with good visualization of the bladder. The bladder blade was inserted and the vesicouterine peritoneum was incised transversely and the bladder flap was created digitally. The bladder blade was reinserted. The  lower uterine segment was incised in a transverse fashion.  The vertex was elevated and delivered through the incision. The remainder of the infant was delivered without difficulty. The umbilical cord was clamped and cut and the infant was handed off  the field. Cord ph and cord bloods were obtained. The placenta was removed intact and appeared normal. The uterine incision was closed with running locked sutures of 0 Monocryl. Second layer closure was placed imbricating the first for hemostasis. The abdominal cavity was irrigated and cleared of all clot and debris. The uterine incision was inspected and noted to be hemostatic. Rectus muscles were reapproximated in the midline with 0 Vicryl.  The fascia was closed with 0 PDS in running continuous fashion. The subcutaneous tissue was closed with 3-0 Vicryl. The skin was closed in subcuticular fashion with 4-0 Vicryl.   Instrument, sponge, and needle counts were correct prior the abdominal closure and at the conclusion of the case. Mother and baby  to recovery room in stable condition.              Complications:     None          Antibiotics: cefazolin (Ancef)/Azithromycin                        Petar Contreras MD  3/5/2017  5:25 AM

## 2017-03-05 NOTE — LACTATION NOTE
This note was copied from a baby's chart.  First baby C/S 3/5 after prolonged labor and pushing. Baby has nursed well x1 since birth @ 0435 today. Baby is in VERENICE . Taught mom RPS and took the colostrum and placed on baby's lips. Mom  knows to call LC for help as needed. Hx of PCOS, hypothyroid , gestational hypertension ( reason for induction).

## 2017-03-05 NOTE — PLAN OF CARE
Problem: Patient Care Overview (Adult)  Goal: Plan of Care Review  Outcome: Ongoing (interventions implemented as appropriate)    03/05/17 0544   Coping/Psychosocial Response Interventions   Plan Of Care Reviewed With patient;spouse   Patient Care Overview   Progress progress toward functional goals as expected       Goal: Adult Individualization and Mutuality  Outcome: Ongoing (interventions implemented as appropriate)    03/04/17 1928 03/05/17 0544   Individualization   Patient Specific Preferences Vaginal delivery, breastfeed in recovery --    Patient Specific Goals Manage pain, frequent position changes --    Patient Specific Interventions Epidural for comfort --    Mutuality/Individual Preferences   What Anxieties, Fears or Concerns Do You Have About Your Health or Care? --  c section   What Questions Do You Have About Your Health or Care? --  healing, pain control   What Information Would Help Us Give You More Personalized Care? --  education       Goal: Discharge Needs Assessment    03/05/17 0544   Discharge Needs Assessment   Concerns To Be Addressed no discharge needs identified   Readmission Within The Last 30 Days no previous admission in last 30 days   Equipment Needed After Discharge none   Discharge Disposition home or self-care   Current Health   Anticipated Changes Related to Illness none   Self-Care   Equipment Currently Used at Home none   Living Environment   Transportation Available car         Problem: Labor (Cervical Ripen, Induct, Augment) (Adult,Obstetrics,Pediatric)  Goal: Signs and Symptoms of Listed Potential Problems Will be Absent or Manageable (Labor)  Outcome: Outcome(s) achieved Date Met:  03/05/17 03/05/17 0544   Labor (Cervical Ripen, Induct, Augment)   Problems Assessed (Labor) all   Problems Present (Labor) none

## 2017-03-05 NOTE — ANESTHESIA POSTPROCEDURE EVALUATION
Patient: Jyoti Ghotra    Procedure Summary     Date Anesthesia Start Anesthesia Stop Room / Location    17 [id number] (17)  [facility] /  [facility]       Procedure Diagnosis Surgeon Provider     SECTION PRIMARY (N/A Abdomen) No diagnosis on file. MD Fercho Llanos MD          Anesthesia Type: epidural  Last vitals  BP      Temp      Pulse     Resp      SpO2        Post Anesthesia Care and Evaluation    Patient location during evaluation: PACU  Patient participation: complete - patient participated  Level of consciousness: awake and alert  Pain management: adequate  Airway patency: patent  Anesthetic complications: No anesthetic complications    Cardiovascular status: acceptable  Respiratory status: acceptable  Hydration status: acceptable

## 2017-03-06 PROBLEM — Z34.90 PREGNANCY: Status: RESOLVED | Noted: 2017-02-18 | Resolved: 2017-03-06

## 2017-03-06 LAB
BASOPHILS # BLD AUTO: 0.02 10*3/MM3 (ref 0–0.2)
BASOPHILS NFR BLD AUTO: 0.2 % (ref 0–1.5)
DEPRECATED RDW RBC AUTO: 45.9 FL (ref 37–54)
EOSINOPHIL # BLD AUTO: 0.16 10*3/MM3 (ref 0–0.7)
EOSINOPHIL NFR BLD AUTO: 1.2 % (ref 0.3–6.2)
ERYTHROCYTE [DISTWIDTH] IN BLOOD BY AUTOMATED COUNT: 14.7 % (ref 11.7–13)
HCT VFR BLD AUTO: 30.9 % (ref 35.6–45.5)
HGB BLD-MCNC: 10.1 G/DL (ref 11.9–15.5)
IMM GRANULOCYTES # BLD: 0.06 10*3/MM3 (ref 0–0.03)
IMM GRANULOCYTES NFR BLD: 0.5 % (ref 0–0.5)
LYMPHOCYTES # BLD AUTO: 2.71 10*3/MM3 (ref 0.9–4.8)
LYMPHOCYTES NFR BLD AUTO: 20.6 % (ref 19.6–45.3)
MCH RBC QN AUTO: 27.7 PG (ref 26.9–32)
MCHC RBC AUTO-ENTMCNC: 32.7 G/DL (ref 32.4–36.3)
MCV RBC AUTO: 84.7 FL (ref 80.5–98.2)
MONOCYTES # BLD AUTO: 1.44 10*3/MM3 (ref 0.2–1.2)
MONOCYTES NFR BLD AUTO: 10.9 % (ref 5–12)
NEUTROPHILS # BLD AUTO: 8.79 10*3/MM3 (ref 1.9–8.1)
NEUTROPHILS NFR BLD AUTO: 66.6 % (ref 42.7–76)
PLATELET # BLD AUTO: 172 10*3/MM3 (ref 140–500)
PMV BLD AUTO: 10.6 FL (ref 6–12)
RBC # BLD AUTO: 3.65 10*6/MM3 (ref 3.9–5.2)
WBC NRBC COR # BLD: 13.18 10*3/MM3 (ref 4.5–10.7)

## 2017-03-06 PROCEDURE — 85025 COMPLETE CBC W/AUTO DIFF WBC: CPT | Performed by: OBSTETRICS & GYNECOLOGY

## 2017-03-06 RX ADMIN — OXYCODONE HYDROCHLORIDE AND ACETAMINOPHEN 1 TABLET: 5; 325 TABLET ORAL at 12:56

## 2017-03-06 RX ADMIN — DOCUSATE SODIUM 100 MG: 100 CAPSULE, LIQUID FILLED ORAL at 19:48

## 2017-03-06 RX ADMIN — Medication 1 TABLET: at 12:56

## 2017-03-06 RX ADMIN — LEVOTHYROXINE SODIUM 75 MCG: 75 TABLET ORAL at 06:15

## 2017-03-06 RX ADMIN — IBUPROFEN 800 MG: 800 TABLET, FILM COATED ORAL at 04:24

## 2017-03-06 RX ADMIN — OXYCODONE HYDROCHLORIDE AND ACETAMINOPHEN 1 TABLET: 5; 325 TABLET ORAL at 04:24

## 2017-03-06 RX ADMIN — DOCUSATE SODIUM 100 MG: 100 CAPSULE, LIQUID FILLED ORAL at 09:18

## 2017-03-06 RX ADMIN — SIMETHICONE CHEW TAB 80 MG 80 MG: 80 TABLET ORAL at 19:48

## 2017-03-06 RX ADMIN — OXYCODONE HYDROCHLORIDE AND ACETAMINOPHEN 1 TABLET: 5; 325 TABLET ORAL at 17:12

## 2017-03-06 RX ADMIN — SIMETHICONE CHEW TAB 80 MG 80 MG: 80 TABLET ORAL at 12:54

## 2017-03-06 RX ADMIN — IBUPROFEN 800 MG: 800 TABLET, FILM COATED ORAL at 12:54

## 2017-03-06 RX ADMIN — DOCUSATE SODIUM 100 MG: 100 CAPSULE, LIQUID FILLED ORAL at 17:13

## 2017-03-06 RX ADMIN — IBUPROFEN 800 MG: 800 TABLET, FILM COATED ORAL at 22:50

## 2017-03-06 RX ADMIN — SIMETHICONE CHEW TAB 80 MG 80 MG: 80 TABLET ORAL at 09:21

## 2017-03-06 RX ADMIN — OXYCODONE HYDROCHLORIDE AND ACETAMINOPHEN 1 TABLET: 7.5; 325 TABLET ORAL at 22:50

## 2017-03-06 RX ADMIN — OXYCODONE HYDROCHLORIDE AND ACETAMINOPHEN 1 TABLET: 5; 325 TABLET ORAL at 09:19

## 2017-03-06 NOTE — PLAN OF CARE
Problem: Patient Care Overview (Adult)  Goal: Plan of Care Review  Outcome: Ongoing (interventions implemented as appropriate)    17 0117   Coping/Psychosocial Response Interventions   Plan Of Care Reviewed With patient   Patient Care Overview   Progress improving   Outcome Evaluation   Outcome Summary/Follow up Plan pain well controlled, ambulating well, up once tonight, breastfeeding improving        Goal: Adult Individualization and Mutuality  Outcome: Ongoing (interventions implemented as appropriate)  Goal: Discharge Needs Assessment  Outcome: Ongoing (interventions implemented as appropriate)    Problem: Breastfeeding (Adult,NICU,,Obstetrics,Pediatric)  Goal: Signs and Symptoms of Listed Potential Problems Will be Absent or Manageable (Breastfeeding)  Outcome: Ongoing (interventions implemented as appropriate)    Problem: Postpartum ( Delivery) (Adult)  Goal: Signs and Symptoms of Listed Potential Problems Will be Absent or Manageable (Postpartum)  Outcome: Ongoing (interventions implemented as appropriate)

## 2017-03-06 NOTE — PROGRESS NOTES
[facility]   PROGRESS NOTE    Patient Name: Jyoti Ghotra  :  1990  MRN:  [id number]      Post-Op Day 1 S/P    Delivered a female infant.  Subjective     Patient reports:    Pain is well controlled. Voiding and ambulating without difficulty.    Tolerating po. Lochia normal.       The patient plans to breastfeed.         Objective       Vitals: Vital Signs Range for the last 24 hours  Temperature: Temp:  [98 °F (36.7 °C)-99.4 °F (37.4 °C)] 98 °F (36.7 °C)   Temp Source: Temp src: Oral   BP: BP: (106-134)/(68-82) 126/79   Pulse: Heart Rate:  [] 88   Respirations: Resp:  [16-18] 16         Intake/Output Summary (Last 24 hours) at 17 0859  Last data filed at 17 0522   Gross per 24 hour   Intake   3180 ml   Output   3250 ml   Net    -70 ml                                              Physical Exam     General Alert and awake, in NAD      CV Regular rate and rhythm      Lungs clear to auscultation bilaterally        Abdomen Soft, non-distended, fundus firm,  1 below umbilicus, appropriately tender      Incision  Dressing clean, dry and intact.      Extremities  tr edema, calves NT               LABS: Lab Results   Component Value Date    WBC 13.18 (H) 2017    HGB 10.1 (L) 2017    HCT 30.9 (L) 2017    MCV 84.7 2017     2017     Prenatal labs results reviewed:  Yes   Rubella:  Immune  Rh Status:  RH TYPE   Date Value Ref Range Status   2017 Negative  Final                       Assessment/Plan        POD 1 S/P C/S:    Hemodynamically stable.  Doing well.      Plan:  Continue routine care.     Active Problems:    * No active hospital problems. *          Lali Negrete, APRN  3/6/2017  8:59 AM

## 2017-03-07 RX ADMIN — LEVOTHYROXINE SODIUM 75 MCG: 75 TABLET ORAL at 06:47

## 2017-03-07 RX ADMIN — IBUPROFEN 800 MG: 800 TABLET, FILM COATED ORAL at 06:47

## 2017-03-07 RX ADMIN — SIMETHICONE CHEW TAB 80 MG 80 MG: 80 TABLET ORAL at 18:20

## 2017-03-07 RX ADMIN — OXYCODONE HYDROCHLORIDE AND ACETAMINOPHEN 1 TABLET: 7.5; 325 TABLET ORAL at 23:45

## 2017-03-07 RX ADMIN — DOCUSATE SODIUM 100 MG: 100 CAPSULE, LIQUID FILLED ORAL at 18:21

## 2017-03-07 RX ADMIN — SIMETHICONE CHEW TAB 80 MG 80 MG: 80 TABLET ORAL at 15:16

## 2017-03-07 RX ADMIN — DOCUSATE SODIUM 100 MG: 100 CAPSULE, LIQUID FILLED ORAL at 09:48

## 2017-03-07 RX ADMIN — OXYCODONE HYDROCHLORIDE AND ACETAMINOPHEN 1 TABLET: 7.5; 325 TABLET ORAL at 11:05

## 2017-03-07 RX ADMIN — OXYCODONE HYDROCHLORIDE AND ACETAMINOPHEN 1 TABLET: 7.5; 325 TABLET ORAL at 15:16

## 2017-03-07 RX ADMIN — IBUPROFEN 800 MG: 800 TABLET, FILM COATED ORAL at 23:45

## 2017-03-07 RX ADMIN — SIMETHICONE CHEW TAB 80 MG 80 MG: 80 TABLET ORAL at 09:48

## 2017-03-07 RX ADMIN — OXYCODONE HYDROCHLORIDE AND ACETAMINOPHEN 1 TABLET: 7.5; 325 TABLET ORAL at 06:47

## 2017-03-07 RX ADMIN — OXYCODONE HYDROCHLORIDE AND ACETAMINOPHEN 1 TABLET: 7.5; 325 TABLET ORAL at 19:40

## 2017-03-07 RX ADMIN — OXYCODONE HYDROCHLORIDE AND ACETAMINOPHEN 1 TABLET: 7.5; 325 TABLET ORAL at 02:50

## 2017-03-07 RX ADMIN — IBUPROFEN 800 MG: 800 TABLET, FILM COATED ORAL at 15:16

## 2017-03-07 RX ADMIN — Medication 1 TABLET: at 18:20

## 2017-03-07 NOTE — LACTATION NOTE
Pt states things are coming together. Infant getting more coordinated at breast but only nurses for about 5 minutes a side. Pt is pumping with HGP after feedings and supplementing due to jaundice. Denies questions or concerns at this time. Encouraged pt to call for feeding observation today and encouraged follow up in clinic within one week of discharge.

## 2017-03-07 NOTE — PLAN OF CARE
Problem: Breastfeeding (Adult,NICU,,Obstetrics,Pediatric)  Goal: Signs and Symptoms of Listed Potential Problems Will be Absent or Manageable (Breastfeeding)  Outcome: Ongoing (interventions implemented as appropriate)    17 1528   Breastfeeding   Problems Assessed (Breastfeeding) all   Problems Present (Breastfeeding) none         Problem: Postpartum ( Delivery) (Adult)  Goal: Signs and Symptoms of Listed Potential Problems Will be Absent or Manageable (Postpartum)  Outcome: Ongoing (interventions implemented as appropriate)    17 1528   Postpartum ( Delivery)   Problems Assessed (Postpartum ) all   Problems Present (Postpartum ) none

## 2017-03-07 NOTE — PROGRESS NOTES
[facility]   PROGRESS NOTE    Patient Name: Jyoti Ghotra  :  1990  MRN:  [id number]      Post-Op 2 S/P   Subjective     Patient reports:   Doing well. Pain well controlled. Tolerating regular diet and having flatus.    Lochia normal.       Objective       Vitals: Vital Signs Range for the last 24 hours  Temperature: Temp:  [98 °F (36.7 °C)-98.4 °F (36.9 °C)] 98 °F (36.7 °C)       BP: BP: (120-140)/(81-86) 134/86   Pulse: Heart Rate:  [87-96] 91   Respirations: Resp:  [16-18] 18                                                     Physical Exam     General Alert       Abdomen Soft, non-distended, fundus firm, 1 below umbilicus, appropriately tender      Incision  Intact, no erythema or exudate      Extremities Calves NT bilaterally                Assessment/Plan   Assessment:  POD 2    Plan: Doing well.  Continue usual cares.    Mod amt edema in panus-- rec binder w/ walking today.  No evidence infection.        Active Problems:    * No active hospital problems. *               Lali Negrete, APRN  3/7/2017  9:43 AM

## 2017-03-08 RX ADMIN — OXYCODONE HYDROCHLORIDE AND ACETAMINOPHEN 1 TABLET: 7.5; 325 TABLET ORAL at 21:41

## 2017-03-08 RX ADMIN — LEVOTHYROXINE SODIUM 75 MCG: 75 TABLET ORAL at 05:52

## 2017-03-08 RX ADMIN — Medication 1 TABLET: at 08:29

## 2017-03-08 RX ADMIN — DOCUSATE SODIUM 100 MG: 100 CAPSULE, LIQUID FILLED ORAL at 17:58

## 2017-03-08 RX ADMIN — IBUPROFEN 800 MG: 800 TABLET, FILM COATED ORAL at 08:30

## 2017-03-08 RX ADMIN — OXYCODONE HYDROCHLORIDE AND ACETAMINOPHEN 1 TABLET: 7.5; 325 TABLET ORAL at 08:29

## 2017-03-08 RX ADMIN — OXYCODONE HYDROCHLORIDE AND ACETAMINOPHEN 1 TABLET: 5; 325 TABLET ORAL at 14:44

## 2017-03-08 RX ADMIN — OXYCODONE HYDROCHLORIDE AND ACETAMINOPHEN 1 TABLET: 7.5; 325 TABLET ORAL at 04:31

## 2017-03-08 RX ADMIN — IBUPROFEN 800 MG: 800 TABLET, FILM COATED ORAL at 17:58

## 2017-03-08 NOTE — LACTATION NOTE
This note was copied from a baby's chart.  Mom states baby nursing both breasts but only 5 mins per side . She is also pumping but doesn't get anything. Encouraged her to be consistent with pumping and to have baby nurse longer .Will follow today.

## 2017-03-08 NOTE — PLAN OF CARE
Problem: Patient Care Overview (Adult)  Goal: Plan of Care Review  Outcome: Ongoing (interventions implemented as appropriate)    17   Coping/Psychosocial Response Interventions   Plan Of Care Reviewed With patient;spouse   Patient Care Overview   Progress improving   Outcome Evaluation   Outcome Summary/Follow up Plan vss, pain well controlled with prn medications, ambulating well, breastfeeding well, will continue to monitor       Goal: Adult Individualization and Mutuality  Outcome: Ongoing (interventions implemented as appropriate)    17   Individualization   Patient Specific Preferences breastfeeding and pain management   Patient Specific Goals pain level less than a 5    Patient Specific Interventions baby rooming in and skin to skin to promote breastfeeding       Goal: Discharge Needs Assessment  Outcome: Ongoing (interventions implemented as appropriate)    17   Discharge Needs Assessment   Concerns To Be Addressed no discharge needs identified         Problem: Breastfeeding (Adult,NICU,[place],Obstetrics,Pediatric)  Goal: Signs and Symptoms of Listed Potential Problems Will be Absent or Manageable (Breastfeeding)  Outcome: Ongoing (interventions implemented as appropriate)    17   Breastfeeding   Problems Assessed (Breastfeeding) all   Problems Present (Breastfeeding) other (see comments) pcos            Problem: Postpartum ( Delivery) (Adult)  Goal: Signs and Symptoms of Listed Potential Problems Will be Absent or Manageable (Postpartum)  Outcome: Ongoing (interventions implemented as appropriate)    17   Postpartum ( Delivery)   Problems Assessed (Postpartum ) all   Problems Present (Postpartum ) none

## 2017-03-08 NOTE — PROGRESS NOTES
[facility]   PROGRESS NOTE    Patient Name: Jyoti Ghotra  :  1990  MRN:  [id number]      Post-Op 3 S/P   Subjective     Patient reports:   Doing well. Pain well controlled. Binder is helping. Tolerating regular diet and having flatus.    Lochia normal.       Objective       Vitals: Vital Signs Range for the last 24 hours  Temperature: Temp:  [97.9 °F (36.6 °C)-98.2 °F (36.8 °C)] 97.9 °F (36.6 °C)       BP: BP: (114-142)/(69-84) 114/71   Pulse: Heart Rate:  [82-99] 82   Respirations: Resp:  [18] 18                                                     Physical Exam     General Alert       Abdomen Soft, non-distended, fundus firm, 1 below umbilicus, appropriately tender; mod edema in panus/ mons      Incision  Intact, no erythema or exudate      Extremities Calves NT bilaterally                Assessment/Plan   Assessment:  POD 3    Plan: Doing well.  Continue usual cares.    Rec binder w/ ambulation only.          Active Problems:    * No active hospital problems. *               Lali Negrete, APRN  3/8/2017  8:48 AM

## 2017-03-08 NOTE — LACTATION NOTE
This note was copied from a baby's chart.  Baby is on bili bed and is too sleepy to nurse most feedings. Parents feel comfortable pumping and feeding EBM ( last pump 23cc). Reassured mom that baby can still go to breast when jaundice is resolved. Mom feelng much better.

## 2017-03-09 VITALS
TEMPERATURE: 97.5 F | HEART RATE: 86 BPM | BODY MASS INDEX: 44.98 KG/M2 | SYSTOLIC BLOOD PRESSURE: 127 MMHG | RESPIRATION RATE: 18 BRPM | HEIGHT: 65 IN | DIASTOLIC BLOOD PRESSURE: 78 MMHG | OXYGEN SATURATION: 99 % | WEIGHT: 270 LBS

## 2017-03-09 RX ORDER — IBUPROFEN 800 MG/1
800 TABLET ORAL EVERY 8 HOURS PRN
Qty: 30 TABLET | Refills: 3 | Status: SHIPPED | OUTPATIENT
Start: 2017-03-09

## 2017-03-09 RX ORDER — OXYCODONE HYDROCHLORIDE AND ACETAMINOPHEN 5; 325 MG/1; MG/1
1 TABLET ORAL EVERY 4 HOURS PRN
Qty: 30 TABLET | Refills: 0 | Status: SHIPPED | OUTPATIENT
Start: 2017-03-09 | End: 2017-03-12

## 2017-03-09 RX ADMIN — OXYCODONE HYDROCHLORIDE AND ACETAMINOPHEN 1 TABLET: 7.5; 325 TABLET ORAL at 02:06

## 2017-03-09 RX ADMIN — SIMETHICONE CHEW TAB 80 MG 80 MG: 80 TABLET ORAL at 02:06

## 2017-03-09 RX ADMIN — Medication 1 TABLET: at 10:17

## 2017-03-09 RX ADMIN — IBUPROFEN 800 MG: 800 TABLET, FILM COATED ORAL at 02:06

## 2017-03-09 RX ADMIN — IBUPROFEN 800 MG: 800 TABLET, FILM COATED ORAL at 10:17

## 2017-03-09 RX ADMIN — LEVOTHYROXINE SODIUM 75 MCG: 75 TABLET ORAL at 06:21

## 2017-03-09 RX ADMIN — OXYCODONE HYDROCHLORIDE AND ACETAMINOPHEN 1 TABLET: 7.5; 325 TABLET ORAL at 15:11

## 2017-03-09 RX ADMIN — OXYCODONE HYDROCHLORIDE AND ACETAMINOPHEN 1 TABLET: 7.5; 325 TABLET ORAL at 06:21

## 2017-03-09 RX ADMIN — OXYCODONE HYDROCHLORIDE AND ACETAMINOPHEN 1 TABLET: 7.5; 325 TABLET ORAL at 10:17

## 2017-03-09 NOTE — DISCHARGE SUMMARY
Date of Discharge:  3/9/2017    Discharge Diagnosis: primary c/s    Presenting Problem/History of Present Illness  Pregnancy [Z33.1]       Hospital Course  Patient is a 26 y.o. female presented for IOL d/t gest HTN.  Delivered viable female infant per Dr. Contreras by c/s d/t arrest of descent.  Baby on bili lights for 3d.  Mom doing well.  Ready for d/c.      Procedures Performed  Procedure(s):   SECTION PRIMARY         Condition on Discharge:  Post-Op Day 4 S/P   Subjective     Patient reports:    Doing well - ready for discharge. Pain well controlled. Tolerating po and   having flatus. Voiding and ambulating without difficulty. Lochia normal.     Vital Signs  Temp:  [97.5 °F (36.4 °C)-98.1 °F (36.7 °C)] 97.5 °F (36.4 °C)  Heart Rate:  [82-92] 86  Resp:  [16-18] 18  BP: (122-135)/(78-85) 127/78    Physical Exam    Gen Alert and awake   Abdomen Soft, ND,  Fundus firm with minimal tenderness; mod edema in panus   Incision  Intact, without erythema or exudate   Extremities Calves NT bilaterally     Assessment/Plan ]   Assessment:  POD 4  -  Doing well. Stable for discharge.     Plan:    Instructions reviewed       Consults:   Consults     No orders found from 2017 to 3/4/2017.          Discharge Disposition  Home or Self Care    Discharge Medications   Jyoti Ghotra   Home Medication Instructions JAYLIN:[id number]    Printed on:17 0855   Medication Information                      aspirin 81 MG EC tablet  Take 81 mg by mouth Daily.             ibuprofen (ADVIL,MOTRIN) 800 MG tablet  Take 1 tablet by mouth Every 8 (Eight) Hours As Needed for Mild Pain (1-3).             levothyroxine (SYNTHROID, LEVOTHROID) 75 MCG tablet  Take 75 mcg by mouth Daily.             oxyCODONE-acetaminophen (PERCOCET) 5-325 MG per tablet  Take 1 tablet by mouth Every 4 (Four) Hours As Needed for moderate pain (4-6) for up to 3 days.             Prenatal Vit-Fe Fumarate-FA (PRENATAL VITAMIN 27-0.8) 27-0.8 MG  tablet tablet  Take 1 tablet by mouth Daily.                 Activity at Discharge:     Follow-up Appointments  No future appointments.  Additional Instructions for the Follow-ups that You Need to Schedule     Call MD With Problems / Concerns    As directed    Instructions:  call for fever, increased vaginal bleeding or pain, any other concerns                 Test Results Pending at Discharge       Lali Negrete, DUANE  03/09/17  8:39 AM

## 2017-03-09 NOTE — LACTATION NOTE
This note was copied from a baby's chart.  Mom feeling very full today. Continuing to pump with HGP. This morning she fed 45cc EBM to Kami. Bili is coming down and Mom and Baby and staying another day.

## 2019-06-17 ENCOUNTER — HOSPITAL ENCOUNTER (OUTPATIENT)
Dept: GENERAL RADIOLOGY | Facility: HOSPITAL | Age: 29
Discharge: HOME | End: 2019-06-17
Attending: INTERNAL MEDICINE

## (undated) DEVICE — KT ART BLD GAS QUICK DRAW

## (undated) DEVICE — SOL IRR NACL 0.9PCT BT 1000ML

## (undated) DEVICE — SLV SCD KN ADJ EXPRSS LG

## (undated) DEVICE — SUT VIC 4/0 KS 27IN J662H

## (undated) DEVICE — NDL BLNT 18G 1 1/2IN

## (undated) DEVICE — SUT VIC 0 CT1 36IN J946H

## (undated) DEVICE — 3M™ MEDIPORE™ H SOFT CLOTH SURGICAL TAPE 2864, 4 INCH X 10 YARD (10CM X 9,14M), 12 ROLLS/CASE: Brand: 3M™ MEDIPORE™

## (undated) DEVICE — SUT PDS 0 CTX 36IN DYED Z370T

## (undated) DEVICE — SUT MNCRYL 0/0 CTX 36IN Y398H

## (undated) DEVICE — SUT VIC 3/0 CTI 36IN J944H